# Patient Record
Sex: MALE | Race: OTHER | Employment: FULL TIME | URBAN - METROPOLITAN AREA
[De-identification: names, ages, dates, MRNs, and addresses within clinical notes are randomized per-mention and may not be internally consistent; named-entity substitution may affect disease eponyms.]

---

## 2017-09-29 ENCOUNTER — HOSPITAL ENCOUNTER (EMERGENCY)
Age: 32
Discharge: HOME | End: 2017-09-29
Attending: EMERGENCY MEDICINE

## 2017-09-29 VITALS
WEIGHT: 255 LBS | DIASTOLIC BLOOD PRESSURE: 78 MMHG | HEART RATE: 91 BPM | TEMPERATURE: 98.4 F | BODY MASS INDEX: 36.51 KG/M2 | OXYGEN SATURATION: 99 % | RESPIRATION RATE: 16 BRPM | HEIGHT: 70 IN | SYSTOLIC BLOOD PRESSURE: 128 MMHG

## 2017-09-29 DIAGNOSIS — H66.92 LEFT OTITIS MEDIA, UNSPECIFIED CHRONICITY, UNSPECIFIED OTITIS MEDIA TYPE: ICD-10-CM

## 2017-09-29 DIAGNOSIS — J06.9 UPPER RESPIRATORY TRACT INFECTION, UNSPECIFIED TYPE: Primary | ICD-10-CM

## 2017-09-29 PROCEDURE — 99283 EMERGENCY DEPT VISIT LOW MDM: CPT

## 2017-09-29 RX ORDER — GUAIFENESIN AND DEXTROMETHORPHAN HYDROBROMIDE 100; 10 MG/5ML; MG/5ML
5 SOLUTION ORAL EVERY 6 HOURS PRN
Qty: 120 ML | Refills: 0 | Status: SHIPPED | OUTPATIENT
Start: 2017-09-29

## 2017-09-29 RX ORDER — CETIRIZINE HYDROCHLORIDE 10 MG/1
10 TABLET ORAL DAILY
Qty: 15 TABLET | Refills: 0 | Status: SHIPPED | OUTPATIENT
Start: 2017-09-29

## 2017-09-29 RX ORDER — AMOXICILLIN 875 MG/1
875 TABLET, COATED ORAL 2 TIMES DAILY
Qty: 20 TABLET | Refills: 0 | Status: SHIPPED | OUTPATIENT
Start: 2017-09-29 | End: 2017-10-09

## 2017-09-29 ASSESSMENT — ENCOUNTER SYMPTOMS
SORE THROAT: 1
ABDOMINAL PAIN: 0
NAUSEA: 0
VOMITING: 0
WHEEZING: 0
RHINORRHEA: 1
TROUBLE SWALLOWING: 0
SHORTNESS OF BREATH: 0
COUGH: 1

## 2017-09-29 ASSESSMENT — PAIN DESCRIPTION - LOCATION: LOCATION: HEAD

## 2017-09-29 ASSESSMENT — PAIN DESCRIPTION - PAIN TYPE: TYPE: ACUTE PAIN

## 2017-09-29 ASSESSMENT — PAIN SCALES - GENERAL: PAINLEVEL_OUTOF10: 7

## 2017-09-29 ASSESSMENT — PAIN DESCRIPTION - ONSET: ONSET: ON-GOING

## 2017-09-29 ASSESSMENT — PAIN DESCRIPTION - FREQUENCY: FREQUENCY: CONTINUOUS

## 2017-09-29 ASSESSMENT — PAIN DESCRIPTION - DESCRIPTORS: DESCRIPTORS: PRESSURE

## 2017-09-29 NOTE — ED AVS SNAPSHOT
After Visit Summary  (Discharge Instructions)    Medication List for Home    Based on the information you provided to us as well as any changes during this visit, the following is your updated medication list.  Compare this with your prescription bottles at home. If you have any questions or concerns, contact your primary care physician's office. Daily Medication List (This medication list can be shared with any Healthcare provider who is helping you manage your medications)      There are NEW medications for you. START taking them after you leave the hospital     amoxicillin 875 MG tablet   Commonly known as:  AMOXIL   Take 1 tablet by mouth 2 times daily for 10 days       cetirizine 10 MG tablet   Commonly known as:  ZYRTEC ALLERGY   Take 1 tablet by mouth daily       Dextromethorphan-guaiFENesin  MG/5ML Syrp   Commonly known as:  ROBITUSSIN DM   Take 5 mLs by mouth every 6 hours as needed for Cough            Where to Get Your Medications      You can get these medications from any pharmacy     Bring a paper prescription for each of these medications     amoxicillin 875 MG tablet    cetirizine 10 MG tablet    Dextromethorphan-guaiFENesin  MG/5ML Syrp               Allergies as of 9/29/2017     No Known Allergies      Immunizations as of 9/29/2017     No immunizations on file. After Visit Summary    This summary was created for you. Thank you for entrusting your care to us.   The following information includes details about your hospital/visit stay along with steps you should take to help with your recovery once you leave the hospital.  In this packet, you will find information about the topics listed below:    · Instructions about your medications including a list of your home medications  · A summary of your hospital visit  · Follow-up appointments once you have left the hospital  · Your care plan at home You may receive a survey regarding the care you received during your stay. Your input is valuable to us. We encourage you to complete and return your survey in the envelope provided. We hope you will choose us in the future for your healthcare needs. Patient Information     Patient Name DAVID Hamilton 1985      Care Provided at:     Name Address Phone       Rickey [address] [facility] [place]  [address] [phone]            Your Visit    Here you will find information about your visit, including the reason for your visit. Please take this sheet with you when you visit your doctor or other health care provider in the future. It will help determine the best possible medical care for you at that time. If you have any questions once you leave the hospital, please call the department phone number listed below. Diagnoses this visit     Your diagnoses were UPPER RESPIRATORY TRACT INFECTION, UNSPECIFIED TYPE and LEFT OTITIS MEDIA, UNSPECIFIED CHRONICITY, UNSPECIFIED OTITIS MEDIA TYPE. Visit Information     Date of Visit Department Dept Phone    2017 [facility] [phone]      You were seen by     You were seen by Tennille Gan MD and Haleigh Simmons CNP. Follow-up Appointments    Below is a list of your follow-up and future appointments. This may not be a complete list as you may have made appointments directly with providers that we are not aware of or your providers may have made some for you. Please call your providers to confirm appointments. It is important to keep your appointments. Please bring your current insurance card, photo ID, co-pay, and all medication bottles to your appointment. If self-pay, payment is expected at the time of service. Follow-up Information     Please follow up.     Contact information:    PCP      Preventive Care        Date Due    Yearly Flu Vaccine (1) 2017 Care Plan Once You Return Home    This section includes instructions you will need to follow once you leave the hospital.  Your care team will discuss these with you, so you and those caring for you know how to best care for your health needs at home. This section may also include educational information about certain health topics that may be of help to you. Important Information if you smoke or are exposed to smoking       SMOKING: QUIT SMOKING. THIS IS THE MOST IMPORTANT ACTION YOU CAN TAKE TO IMPROVE YOUR CURRENT AND FUTURE HEALTH. Call the [facility] NOW ([phone])    Smoking harms nonsmokers. When nonsmokers are around people who smoke, they absorb nicotine, carbon monoxide, and other ingredients of tobacco smoke. DO NOT SMOKE AROUND CHILDREN     Children exposed to secondhand smoke are at an increased risk of:  Sudden Infant Death Syndrome (SIDS), acute respiratory infections, inflammation of the middle ear, and severe asthma. Over a longer time, it causes heart disease and lung cancer. There is no safe level of exposure to secondhand smoke. Important information for a smoker       SMOKING: QUIT SMOKING. THIS IS THE MOST IMPORTANT ACTION YOU CAN TAKE TO IMPROVE YOUR CURRENT AND FUTURE HEALTH. Call the [facility] NOW ([phone])    Smoking harms nonsmokers. When nonsmokers are around people who smoke, they absorb nicotine, carbon monoxide, and other ingredients of tobacco smoke. DO NOT SMOKE AROUND CHILDREN     Children exposed to secondhand smoke are at an increased risk of:  Sudden Infant Death Syndrome (SIDS), acute respiratory infections, inflammation of the middle ear, and severe asthma. Over a longer time, it causes heart disease and lung cancer. There is no safe level of exposure to secondhand smoke.                 [employer] Local.com allows you to send messages to your doctor, view your test results, renew your prescriptions, schedule appointments, view visit notes, and more. How Do I Sign Up? 1. In your Internet browser, go to [email or web address]  2. Click on the Sign Up Now link in the Sign In box. You will see the New Member Sign Up page. 3. Enter your Local.com Access Code exactly as it appears below. You will not need to use this code after youve completed the sign-up process. If you do not sign up before the expiration date, you must request a new code. Local.com Access Code: [email or web address]  Expires: 11/28/2017  1:22 PM    4. Enter your Social Security Number ([id number]) and Date of Birth (mm/dd/yyyy) as indicated and click Submit. You will be taken to the next sign-up page. 5. Create a Local.com ID. This will be your Local.com login ID and cannot be changed, so think of one that is secure and easy to remember. 6. Create a Local.com password. You can change your password at any time. 7. Enter your Password Reset Question and Answer. This can be used at a later time if you forget your password. 8. Enter your e-mail address. You will receive e-mail notification when new information is available in [address] 9. Click Sign Up. You can now view your medical record. Additional Information  If you have questions, please contact the physician practice where you receive care. Remember, Local.com is NOT to be used for urgent needs. For medical emergencies, dial 911. For questions regarding your Local.com account call [phone]. If you have a clinical question, please call your doctor's office. View your information online  ? Review your current list of  medications, immunization, and allergies. ? Review your future test results online . ?  Review your discharge instructions provided by your caregivers at discharge    Certain functionality such as prescription refills, scheduling problems. It's also a good idea to know your test results and keep a list of the medicines you take. How can you care for yourself at home? · To prevent dehydration, drink plenty of fluids, enough so that your urine is light yellow or clear like water. Choose water and other caffeine-free clear liquids until you feel better. If you have kidney, heart, or liver disease and have to limit fluids, talk with your doctor before you increase the amount of fluids you drink. · Take an over-the-counter pain medicine, such as acetaminophen (Tylenol), ibuprofen (Advil, Motrin), or naproxen (Aleve). Read and follow all instructions on the label. · Before you use cough and cold medicines, check the label. These medicines may not be safe for young children or for people with certain health problems. · Be careful when taking over-the-counter cold or flu medicines and Tylenol at the same time. Many of these medicines have acetaminophen, which is Tylenol. Read the labels to make sure that you are not taking more than the recommended dose. Too much acetaminophen (Tylenol) can be harmful. · Get plenty of rest.  · Do not smoke or allow others to smoke around you. If you need help quitting, talk to your doctor about stop-smoking programs and medicines. These can increase your chances of quitting for good. When should you call for help? Call 911 anytime you think you may need emergency care. For example, call if:  · You have severe trouble breathing. Call your doctor now or seek immediate medical care if:  · You seem to be getting much sicker. · You have new or worse trouble breathing. · You have a new or higher fever. · You have a new rash. Watch closely for changes in your health, and be sure to contact your doctor if:  · You have a new symptom, such as a sore throat, an earache, or sinus pain. · You cough more deeply or more often, especially if you notice more mucus or a change in the color of your mucus. · You do not get better as expected. Where can you learn more? Go to https://chpepiceweb.[email or web address] org and sign in to your [employer] account. Enter G310 in the Popular Pays box to learn more about \"Upper Respiratory Infection (Cold): Care Instructions. \"     If you do not have an account, please click on the \"Sign Up Now\" link. Current as of: March 25, 2017  Content Version: 11.3  © 9056-1658 Astrid. Care instructions adapted under license by [employer]. If you have questions about a medical condition or this instruction, always ask your healthcare professional. [address] any warranty or liability for your use of this information. Ear Infection (Otitis Media): Care Instructions  Your Care Instructions    An ear infection may start with a cold and affect the middle ear (otitis media). It can hurt a lot. Most ear infections clear up on their own in a couple of days. Most often you will not need antibiotics. This is because many ear infections are caused by a virus. Antibiotics don't work against a virus. Regular doses of pain medicines are the best way to reduce your fever and help you feel better. Follow-up care is a key part of your treatment and safety. Be sure to make and go to all appointments, and call your doctor if you are having problems. It's also a good idea to know your test results and keep a list of the medicines you take. How can you care for yourself at home? · Take pain medicines exactly as directed. ¨ If the doctor gave you a prescription medicine for pain, take it as prescribed. ¨ If you are not taking a prescription pain medicine, take an over-the-counter medicine, such as acetaminophen (Tylenol), ibuprofen (Advil, Motrin), or naproxen (Aleve). Read and follow all instructions on the label.   ¨ Do not take two or more pain medicines at the same time unless the doctor told you to. Many pain medicines have acetaminophen, which is Tylenol. Too much acetaminophen (Tylenol) can be harmful. · Plan to take a full dose of pain reliever before bedtime. Getting enough sleep will help you get better. · Try a warm, moist washcloth on the ear. It may help relieve pain. · If your doctor prescribed antibiotics, take them as directed. Do not stop taking them just because you feel better. You need to take the full course of antibiotics. When should you call for help? Call your doctor now or seek immediate medical care if:  · You have new or increasing ear pain. · You have new or increasing pus or blood draining from your ear. · You have a fever with a stiff neck or a severe headache. Watch closely for changes in your health, and be sure to contact your doctor if:  · You have new or worse symptoms. · You are not getting better after taking an antibiotic for 2 days. Where can you learn more? Go to https://Halldis.AriadNEXT. org and sign in to your Battle[employer] account. Enter U247 in the [employer] Entertainment box to learn more about \"Ear Infection (Otitis Media): Care Instructions. \"     If you do not have an account, please click on the \"Sign Up Now\" link. Current as of: May 4, 2017  Content Version: 11.3  © 2942-3824 [employer]orporated. Care instructions adapted under license by [facility] ([facility]). If you have questions about a medical condition or this instruction, always ask your healthcare professional. [place] [address] any warranty or liability for your use of this information.

## 2017-09-29 NOTE — ED PROVIDER NOTES
[address] ED  eMERGENCY dEPARTMENT eNCOUnter      Pt Name: Gail Casiano  MRN: [id number]  [place] 1985  Date of evaluation: 9/29/2017  Provider: Amadou Araya       Chief Complaint   Patient presents with    Cough    Nasal Congestion    Pharyngitis         HISTORY OF PRESENT ILLNESS  (Location/Symptom, Timing/Onset, Context/Setting, Quality, Duration, Modifying Factors, Severity.)   Gail Casiano is a 28 y.o. male who presents to the emergency department with complaints of cough, congestion and sore throat. Relates that symptoms started 3 days ago with sore throat. Now he is having rhinorrhea, congestion and dry cough. No fever or chills. No nausea or vomiting. No chest pain, shortness of breath or wheezing. Smokes pack of cigarettes a day. Nursing Notes were reviewed and I agree. REVIEW OF SYSTEMS    (2-9 systems for level 4, 10 or more for level 5)     Review of Systems   Constitutional: Negative for chills and fever. HENT: Positive for congestion, rhinorrhea and sore throat. Negative for trouble swallowing. Respiratory: Positive for cough. Negative for shortness of breath and wheezing. Cardiovascular: Negative for chest pain and palpitations. Gastrointestinal: Negative for abdominal pain, nausea and vomiting. Neurological: Negative for dizziness and headaches. Except as noted above the remainder of the review of systems was reviewed and negative. PAST MEDICAL HISTORY   No past medical history on file. Reviewed. SURGICAL HISTORY     No past surgical history on file. Reviewed. CURRENT MEDICATIONS       Previous Medications    No medications on file   No home medications. ALLERGIES     Review of patient's allergies indicates no known allergies. FAMILY HISTORY     No family history on file. No family status information on file. Reviewed and not relevant. SOCIAL HISTORY        Reviewed.    PHYSICAL EXAM    (up to 7 for level 4, 8 or more for level 5)   ED Triage Vitals   BP Temp Temp src Pulse Resp SpO2 Height Weight   -- -- -- -- -- -- -- --                 Physical Exam   Constitutional: He is oriented to person, place, and time. He appears well-developed and well-nourished. No distress. HENT:   Head: Normocephalic and atraumatic. Right Ear: Tympanic membrane, external ear and ear canal normal.   Left Ear: External ear normal. Tympanic membrane is erythematous. Tympanic membrane is not retracted and not bulging. Nose: Rhinorrhea present. Mouth/Throat: Uvula is midline and mucous membranes are normal. No trismus in the jaw. No uvula swelling. Posterior oropharyngeal erythema present. No oropharyngeal exudate. Eyes: Right eye exhibits no discharge. Left eye exhibits no discharge. No scleral icterus. Neck: Normal range of motion. No tracheal deviation present. Cardiovascular: Normal rate, regular rhythm and normal heart sounds. Pulmonary/Chest: Effort normal and breath sounds normal. No stridor. No respiratory distress. He has no wheezes. He has no rales. Musculoskeletal: Normal range of motion. He exhibits no edema. Neurological: He is alert and oriented to person, place, and time. Coordination normal.   Skin: Skin is warm and dry. He is not diaphoretic. Psychiatric: He has a normal mood and affect. His behavior is normal.       DIAGNOSTIC RESULTS     RADIOLOGY:   none    LABS:  Labs Reviewed - No data to display    All other labs were within normal range or not returned as of this dictation. EMERGENCY DEPARTMENT COURSE and DIFFERENTIAL DIAGNOSIS/MDM:   Patient presents to ED with complaints of Cough, congestion and sore throat. We'll treat for URI and left otitis media with amoxicillin. Cough medication, antihistamine. Increase fluids. Work note given. Okay to discharge home. Follow-up with family doctor or clinic of choice in 1 or 2 days for a recheck.   Return to ED if any worsening or new

## 2017-09-30 NOTE — ED PROVIDER NOTES
[address] ED  eMERGENCY dEPARTMENT eNCOUnter   Independent Attestation     Pt Name: Henok Adams  MRN: [id number]  [place] 1985  Date of evaluation: 9/29/17     Henok Adams is a 28 y.o. male with CC: Cough; Nasal Congestion; and Pharyngitis      Based on the medical record the care appears appropriate. I was personally available for consultation in the Emergency Department.     Christy Gonzalez MD  Attending Emergency Physician                 Christy Gonzalez MD  09/29/17 [id number]

## 2017-11-05 ENCOUNTER — HOSPITAL ENCOUNTER (EMERGENCY)
Age: 32
Discharge: HOME | End: 2017-11-05
Attending: EMERGENCY MEDICINE

## 2017-11-05 VITALS
HEART RATE: 73 BPM | HEIGHT: 71 IN | SYSTOLIC BLOOD PRESSURE: 148 MMHG | OXYGEN SATURATION: 96 % | RESPIRATION RATE: 16 BRPM | WEIGHT: 250 LBS | DIASTOLIC BLOOD PRESSURE: 93 MMHG | BODY MASS INDEX: 35 KG/M2 | TEMPERATURE: 98.5 F

## 2017-11-05 DIAGNOSIS — K02.9 DENTAL CARIES: ICD-10-CM

## 2017-11-05 DIAGNOSIS — K08.89 PAIN, DENTAL: Primary | ICD-10-CM

## 2017-11-05 PROCEDURE — 6370000000 HC RX 637 (ALT 250 FOR IP): Performed by: EMERGENCY MEDICINE

## 2017-11-05 PROCEDURE — 64402 HC NERVE BLOCK OF FACIAL NERVE: CPT

## 2017-11-05 PROCEDURE — 2500000003 HC RX 250 WO HCPCS: Performed by: EMERGENCY MEDICINE

## 2017-11-05 PROCEDURE — 99282 EMERGENCY DEPT VISIT SF MDM: CPT

## 2017-11-05 RX ORDER — BUPIVACAINE HYDROCHLORIDE 5 MG/ML
30 INJECTION, SOLUTION EPIDURAL; INTRACAUDAL ONCE
Status: DISCONTINUED | OUTPATIENT
Start: 2017-11-05 | End: 2017-11-05

## 2017-11-05 RX ORDER — PENICILLIN V POTASSIUM 500 MG/1
500 TABLET ORAL 4 TIMES DAILY
Qty: 28 TABLET | Refills: 0 | Status: SHIPPED | OUTPATIENT
Start: 2017-11-05 | End: 2017-11-12

## 2017-11-05 RX ORDER — PENICILLIN V POTASSIUM 250 MG/1
500 TABLET ORAL ONCE
Status: COMPLETED | OUTPATIENT
Start: 2017-11-05 | End: 2017-11-05

## 2017-11-05 RX ORDER — BUPIVACAINE HYDROCHLORIDE 2.5 MG/ML
30 INJECTION, SOLUTION EPIDURAL; INFILTRATION; INTRACAUDAL ONCE
Status: COMPLETED | OUTPATIENT
Start: 2017-11-05 | End: 2017-11-05

## 2017-11-05 RX ADMIN — PENICILLIN V POTASIUM 500 MG: 250 TABLET ORAL at 23:24

## 2017-11-05 RX ADMIN — BUPIVACAINE HYDROCHLORIDE 75 MG: 2.5 INJECTION, SOLUTION EPIDURAL; INFILTRATION; INTRACAUDAL; PERINEURAL at 23:24

## 2017-11-05 ASSESSMENT — PAIN SCALES - GENERAL
PAINLEVEL_OUTOF10: 7
PAINLEVEL_OUTOF10: 7

## 2017-11-05 ASSESSMENT — ENCOUNTER SYMPTOMS
NAUSEA: 0
COUGH: 0
VOMITING: 0
SHORTNESS OF BREATH: 0
ABDOMINAL PAIN: 0
RHINORRHEA: 0
SORE THROAT: 0

## 2017-11-05 ASSESSMENT — PAIN DESCRIPTION - LOCATION: LOCATION: TEETH

## 2017-11-05 ASSESSMENT — PAIN DESCRIPTION - ORIENTATION: ORIENTATION: LEFT

## 2017-11-05 ASSESSMENT — PAIN DESCRIPTION - PAIN TYPE: TYPE: ACUTE PAIN

## 2017-11-06 NOTE — ED NOTES
Pt discharged on stable condition. Educated on RX's and discharge instructions reviewed. Pt educated to follow up as recommended with dentist. List of providers given at discharge. Pt ambulating well -stable gait.       Jose Noyola RN  11/05/17 9571

## 2017-11-06 NOTE — ED PROVIDER NOTES
[facility]     Emergency Department     Faculty Attestation    I performed a history and physical examination of the patient and discussed management with the resident. I reviewed the residents note and agree with the documented findings and plan of care. Any areas of disagreement are noted on the chart. I was personally present for the key portions of any procedures. I have documented in the chart those procedures where I was not present during the key portions. I have reviewed the emergency nurses triage note. I agree with the chief complaint, past medical history, past surgical history, allergies, medications, social and family history as documented unless otherwise noted below. Documentation of the HPI, Physical Exam and Medical Decision Making performed by medical students or scribes is based on my personal performance of the HPI, PE and MDM. For APC cases, I have personally evaluated and examined the patient in conjunction with the APC and agree with the assessment, treatment plan, and disposition of the patient as recorded by the APCAdditional findings are as noted.       CHIEF COMPLAINT       Chief Complaint   Patient presents with    Dental Pain       RECENT VITALS:   BP (!) 148/93   Pulse 73   Temp 98.5 °F (36.9 °C) (Oral)   Resp 16   Ht 5' 10.5\" (1.791 m)   Wt 250 lb (113.4 kg)   SpO2 96%   BMI 35.36 kg/m²       PERTINENT ATTENDING PHYSICIAN COMMENTS:            Kristyn Louis MD, ANAST[place]IA, [address] [facility]  Attending Emergency Physician           Kristyn Louis MD  11/05/17 1362

## 2017-11-06 NOTE — ED PROVIDER NOTES
[address]  Emergency Department Encounter  Emergency Medicine Resident     Pt Name: Henok Adams  MRN: [id number]  [place] 1985  Date of evaluation: 11/5/17  PCP:  No primary care provider on file. CHIEF COMPLAINT       Chief Complaint   Patient presents with    Dental Pain       HISTORY OF PRESENT ILLNESS  (Location/Symptom, Timing/Onset, Context/Setting, Quality, Duration, Modifying Factors, Severity.)      Henok Adams is a 28 y.o. male who presents with Dental pain. Patient states the pain has been ongoing for the past week and has been progressively worse. Patient localizes pain to the lower left molars. Patient has been able to eat and drink. Patient was taking ibuprofen and Orajel but states that today the pain got worse and has no significant relief with ibuprofen and Orajel. Patient denies any shortness breath, chest pain, fevers, nausea, or vomiting. Patient denies any difficulty swallowing. PAST MEDICAL / SURGICAL / SOCIAL / FAMILY HISTORY      has no past medical history on file. has a past surgical history that includes cyst removal (2001). Social History     Social History    Marital status: Single     Spouse name: N/A    Number of children: N/A    Years of education: N/A     Occupational History    Not on file. Social History Main Topics    Smoking status: Current Every Day Smoker     Packs/day: 0.50     Types: Cigarettes    Smokeless tobacco: Never Used    Alcohol use Yes      Comment: socially    Drug use: No    Sexual activity: Not on file     Other Topics Concern    Not on file     Social History Narrative    No narrative on file       No family history on file. Allergies:  Review of patient's allergies indicates no known allergies. Home Medications:  Prior to Admission medications    Medication Sig Start Date End Date Taking?  Authorizing Provider   penicillin v potassium (VEETID) 500 MG tablet Take 1 tablet by mouth 4 times daily for 7 injected for inferior alveolar block  Patient tolerated procedure well with no immediate complications. CONSULTS:  None    CRITICAL CARE:  None    FINAL IMPRESSION      1. Pain, dental    2.  Dental caries          DISPOSITION / PLAN     DISPOSITION Decision to Discharge    PATIENT REFERRED TO:  [facility] ED  [address] [facility] [address]  [phone]  Go today  As needed, If symptoms worsen    dental clinic from list    Call in 1 day  To follow up as soon as possible      DISCHARGE MEDICATIONS:  New Prescriptions    PENICILLIN V POTASSIUM (VEETID) 500 MG TABLET    Take 1 tablet by mouth 4 times daily for 7 days       Ignacia Stone DO  Emergency Medicine Resident    (Please note that portions of this note were completed with a voice recognition program.  Efforts were made to edit the dictations but occasionally words are mis-transcribed.)      Ignacia Stone DO  11/05/17 [id number]

## 2017-11-14 ENCOUNTER — HOSPITAL ENCOUNTER (EMERGENCY)
Age: 32
Discharge: HOME | End: 2017-11-14
Attending: EMERGENCY MEDICINE

## 2017-11-14 VITALS
WEIGHT: 260 LBS | DIASTOLIC BLOOD PRESSURE: 88 MMHG | OXYGEN SATURATION: 94 % | TEMPERATURE: 98.1 F | BODY MASS INDEX: 36.4 KG/M2 | HEIGHT: 71 IN | RESPIRATION RATE: 18 BRPM | HEART RATE: 107 BPM | SYSTOLIC BLOOD PRESSURE: 120 MMHG

## 2017-11-14 DIAGNOSIS — J01.90 ACUTE SINUSITIS, RECURRENCE NOT SPECIFIED, UNSPECIFIED LOCATION: Primary | ICD-10-CM

## 2017-11-14 DIAGNOSIS — J40 BRONCHITIS: ICD-10-CM

## 2017-11-14 PROCEDURE — 99283 EMERGENCY DEPT VISIT LOW MDM: CPT

## 2017-11-14 RX ORDER — PSEUDOEPHEDRINE HCL 120 MG/1
120 TABLET, FILM COATED, EXTENDED RELEASE ORAL EVERY 12 HOURS
Qty: 14 TABLET | Refills: 0 | Status: SHIPPED | OUTPATIENT
Start: 2017-11-14 | End: 2017-11-21

## 2017-11-14 RX ORDER — FLUTICASONE PROPIONATE 50 MCG
1 SPRAY, SUSPENSION (ML) NASAL 2 TIMES DAILY
Qty: 1 BOTTLE | Refills: 0 | Status: SHIPPED | OUTPATIENT
Start: 2017-11-14

## 2017-11-14 RX ORDER — IBUPROFEN 600 MG/1
600 TABLET ORAL EVERY 6 HOURS PRN
Qty: 30 TABLET | Refills: 0 | Status: SHIPPED | OUTPATIENT
Start: 2017-11-14

## 2017-11-14 ASSESSMENT — PAIN DESCRIPTION - PAIN TYPE: TYPE: ACUTE PAIN

## 2017-11-14 ASSESSMENT — PAIN DESCRIPTION - ORIENTATION: ORIENTATION: MID

## 2017-11-14 ASSESSMENT — ENCOUNTER SYMPTOMS
ABDOMINAL PAIN: 0
RHINORRHEA: 1
VOMITING: 0
BACK PAIN: 0
EYE PAIN: 0
SORE THROAT: 1
COUGH: 1
SHORTNESS OF BREATH: 0
EYE REDNESS: 0
EYE DISCHARGE: 0
DIARRHEA: 0

## 2017-11-14 ASSESSMENT — PAIN SCALES - GENERAL: PAINLEVEL_OUTOF10: 6

## 2017-11-14 ASSESSMENT — PAIN DESCRIPTION - LOCATION: LOCATION: CHEST;BACK

## 2017-11-14 NOTE — ED PROVIDER NOTES
[address]  eMERGENCY dEPARTMENT eNCOUnter      Pt Name: Carla Johnson  MRN: [id number]  [place] 1985  Date of evaluation: 11/14/2017  PCP:    No primary care provider on file. CHIEF COMPLAINT       Chief Complaint   Patient presents with    Cough    Chest Pain    Fatigue         HISTORY OF PRESENT ILLNESS   HPI   Carla Johnson is a 28 y.o. male who presents For evaluation for cough and congestion. Patient states he's had this for about 3 days now. His Raynaud's a congestion. Has a mild sore throat. Also states the cough without productive sputum. He just feels generally fatigued. He was sent in by his work. No fevers however he is chilled at times. Otherwise no specific complaints       REVIEW OF SYSTEMS         Review of Systems   Constitutional: Positive for chills and fatigue. Negative for fever. HENT: Positive for congestion, rhinorrhea and sore throat. Eyes: Negative for pain, discharge and redness. Respiratory: Positive for cough. Negative for shortness of breath. Cardiovascular: Negative for chest pain. Gastrointestinal: Negative for abdominal pain, diarrhea and vomiting. Genitourinary: Negative for dysuria and hematuria. Musculoskeletal: Negative for arthralgias, back pain, myalgias, neck pain and neck stiffness. Skin: Negative for rash. Neurological: Negative for light-headedness and headaches. Hematological: Does not bruise/bleed easily. PAST MEDICAL HISTORY   History reviewed. No pertinent past medical history.     SURGICAL HISTORY       Past Surgical History:   Procedure Laterality Date    CYST REMOVAL  2001       CURRENT MEDICATIONS       Previous Medications    CETIRIZINE (ZYRTEC ALLERGY) 10 MG TABLET    Take 1 tablet by mouth daily    DEXTROMETHORPHAN-GUAIFENESIN (ROBITUSSIN DM)  MG/5ML SYRP    Take 5 mLs by mouth every 6 hours as needed for Cough       ALLERGIES     Review of patient's allergies indicates no known allergies. FAMILY HISTORY       No family status information on file. History reviewed. No pertinent family history. SOCIAL HISTORY       Social History     Social History    Marital status: Single     Spouse name: N/A    Number of children: N/A    Years of education: N/A     Social History Main Topics    Smoking status: Current Every Day Smoker     Packs/day: 0.75     Types: Cigarettes    Smokeless tobacco: Never Used    Alcohol use Yes      Comment: socially    Drug use: No    Sexual activity: Not Asked     Other Topics Concern    None     Social History Narrative    None       PHYSICAL EXAM     INITIAL VITALS:  height is 5' 11\" (1.803 m) and weight is 260 lb (117.9 kg). His oral temperature is 98.1 °F (36.7 °C). His blood pressure is 120/88 and his pulse is 107. His respiration is 18 and oxygen saturation is 94%. Physical Exam   Constitutional: He is oriented to person, place, and time. He appears well-developed and well-nourished. HENT:   Head: Normocephalic and atraumatic. Mouth/Throat: Oropharynx is clear and moist.   Mild pharyngeal erythema without asymmetry and no exudates   Eyes: Conjunctivae and EOM are normal. Pupils are equal, round, and reactive to light. Right eye exhibits no discharge. Left eye exhibits no discharge. Neck: Normal range of motion. Neck supple. Cardiovascular: Regular rhythm, normal heart sounds and intact distal pulses. 92 per minute exam   Pulmonary/Chest: Effort normal and breath sounds normal. No respiratory distress. He has no wheezes. Lung exams unremarkable   Abdominal: Soft. Bowel sounds are normal. He exhibits no distension. There is no tenderness. Musculoskeletal: Normal range of motion. Neurological: He is alert and oriented to person, place, and time. Skin: Skin is warm and dry. Nursing note and vitals reviewed.       DIFFERENTIAL DIAGNOSIS/ MDM:     Patient here with likely a viral process with sinus congestion and sinusitis and cough and bronchitis. Vitals are stable. Lung exam is clear. At this time likely viral process and instructions given with symptom medications recommended    DIAGNOSTIC RESULTS         none    EMERGENCY DEPARTMENT COURSE:   Vitals:    Vitals:    11/14/17 1326   BP: 120/88   Pulse: 107   Resp: 18   Temp: 98.1 °F (36.7 °C)   TempSrc: Oral   SpO2: 94%   Weight: 260 lb (117.9 kg)   Height: 5' 11\" (1.803 m)     -------------------------  BP: 120/88, Temp: 98.1 °F (36.7 °C), Pulse: 107, Resp: 18      FINAL IMPRESSION      1. Acute sinusitis, recurrence not specified, unspecified location    2.  Bronchitis          DISPOSITION/PLAN    DISPOSITION Decision to Discharge    PATIENT REFERRED TO:  your PCP    Call in 1 day        DISCHARGE MEDICATIONS:  New Prescriptions    FLUTICASONE (FLONASE) 50 MCG/ACT NASAL SPRAY    1 spray by Nasal route 2 times daily    IBUPROFEN (ADVIL;MOTRIN) 600 MG TABLET    Take 1 tablet by mouth every 6 hours as needed for Pain    PSEUDOEPHEDRINE (SUDAFED 12 HOUR) 120 MG EXTENDED RELEASE TABLET    Take 1 tablet by mouth every 12 hours for 7 days       (Please note that portions of this note were completed with a voice recognition program.  Efforts were made to edit the dictations but occasionally words are mis-transcribed.)    Chayo Still MD, [place], [facility]  Attending Emergency Physician                     Chayo Still MD  11/14/17 [id number]

## 2019-10-14 ENCOUNTER — HOSPITAL ENCOUNTER (EMERGENCY)
Age: 34
Discharge: HOME | End: 2019-10-14
Attending: EMERGENCY MEDICINE

## 2019-10-14 ENCOUNTER — APPOINTMENT (OUTPATIENT)
Dept: GENERAL RADIOLOGY | Age: 34
End: 2019-10-14

## 2019-10-14 VITALS
HEIGHT: 71 IN | DIASTOLIC BLOOD PRESSURE: 84 MMHG | SYSTOLIC BLOOD PRESSURE: 129 MMHG | RESPIRATION RATE: 16 BRPM | WEIGHT: 300 LBS | HEART RATE: 79 BPM | BODY MASS INDEX: 42 KG/M2 | OXYGEN SATURATION: 99 % | TEMPERATURE: 98.5 F

## 2019-10-14 DIAGNOSIS — M25.512 ACUTE PAIN OF LEFT SHOULDER: Primary | ICD-10-CM

## 2019-10-14 DIAGNOSIS — M77.8 TENDINITIS OF LEFT SHOULDER: ICD-10-CM

## 2019-10-14 PROCEDURE — 99283 EMERGENCY DEPT VISIT LOW MDM: CPT

## 2019-10-14 PROCEDURE — 73030 X-RAY EXAM OF SHOULDER: CPT

## 2019-10-14 RX ORDER — CYCLOBENZAPRINE HCL 10 MG
10 TABLET ORAL 3 TIMES DAILY PRN
Qty: 15 TABLET | Refills: 0 | Status: SHIPPED | OUTPATIENT
Start: 2019-10-14

## 2019-10-14 RX ORDER — NAPROXEN 500 MG/1
500 TABLET ORAL 2 TIMES DAILY
Qty: 20 TABLET | Refills: 0 | Status: SHIPPED | OUTPATIENT
Start: 2019-10-14

## 2019-10-14 ASSESSMENT — PAIN DESCRIPTION - PAIN TYPE: TYPE: ACUTE PAIN

## 2019-10-14 ASSESSMENT — ENCOUNTER SYMPTOMS
COUGH: 0
NAUSEA: 0
SHORTNESS OF BREATH: 0
TROUBLE SWALLOWING: 0
VOMITING: 0

## 2019-10-14 ASSESSMENT — PAIN DESCRIPTION - LOCATION: LOCATION: SHOULDER

## 2019-10-14 ASSESSMENT — PAIN SCALES - GENERAL: PAINLEVEL_OUTOF10: 7

## 2019-10-14 ASSESSMENT — PAIN DESCRIPTION - ORIENTATION: ORIENTATION: LEFT

## 2021-06-05 ENCOUNTER — HOSPITAL ENCOUNTER (EMERGENCY)
Age: 36
Discharge: HOME | End: 2021-06-05
Attending: EMERGENCY MEDICINE

## 2021-06-05 VITALS
TEMPERATURE: 97.5 F | DIASTOLIC BLOOD PRESSURE: 94 MMHG | RESPIRATION RATE: 16 BRPM | OXYGEN SATURATION: 97 % | SYSTOLIC BLOOD PRESSURE: 154 MMHG | HEIGHT: 64 IN | HEART RATE: 85 BPM | BODY MASS INDEX: 34.15 KG/M2 | WEIGHT: 200 LBS

## 2021-06-05 DIAGNOSIS — S05.02XA ABRASION OF LEFT CORNEA, INITIAL ENCOUNTER: Primary | ICD-10-CM

## 2021-06-05 PROCEDURE — 99283 EMERGENCY DEPT VISIT LOW MDM: CPT

## 2021-06-05 PROCEDURE — 2500000003 HC RX 250 WO HCPCS: Performed by: STUDENT IN AN ORGANIZED HEALTH CARE EDUCATION/TRAINING PROGRAM

## 2021-06-05 PROCEDURE — 6370000000 HC RX 637 (ALT 250 FOR IP): Performed by: STUDENT IN AN ORGANIZED HEALTH CARE EDUCATION/TRAINING PROGRAM

## 2021-06-05 RX ORDER — ERYTHROMYCIN 5 MG/G
OINTMENT OPHTHALMIC
Qty: 1 TUBE | Refills: 0 | Status: SHIPPED | OUTPATIENT
Start: 2021-06-05 | End: 2021-06-15

## 2021-06-05 RX ORDER — PROPARACAINE HYDROCHLORIDE 5 MG/ML
1 SOLUTION/ DROPS OPHTHALMIC ONCE
Status: COMPLETED | OUTPATIENT
Start: 2021-06-05 | End: 2021-06-05

## 2021-06-05 RX ORDER — ERYTHROMYCIN 5 MG/G
OINTMENT OPHTHALMIC ONCE
Status: COMPLETED | OUTPATIENT
Start: 2021-06-05 | End: 2021-06-05

## 2021-06-05 RX ADMIN — FLUORESCEIN SODIUM 1 MG: 1 STRIP OPHTHALMIC at 15:00

## 2021-06-05 RX ADMIN — ERYTHROMYCIN: 5 OINTMENT OPHTHALMIC at 15:23

## 2021-06-05 RX ADMIN — PROPARACAINE HYDROCHLORIDE 1 DROP: 5 SOLUTION/ DROPS OPHTHALMIC at 15:00

## 2021-06-05 ASSESSMENT — PAIN DESCRIPTION - PAIN TYPE: TYPE: ACUTE PAIN

## 2021-06-05 ASSESSMENT — ENCOUNTER SYMPTOMS
PHOTOPHOBIA: 1
EYE PAIN: 1
EYE REDNESS: 1
SHORTNESS OF BREATH: 0
RHINORRHEA: 1
COUGH: 0
NAUSEA: 0
SORE THROAT: 0
EYE DISCHARGE: 1
EYE ITCHING: 1
VOMITING: 0
DIARRHEA: 0

## 2021-06-05 ASSESSMENT — PAIN DESCRIPTION - ORIENTATION: ORIENTATION: LEFT;RIGHT

## 2021-06-05 ASSESSMENT — PAIN DESCRIPTION - DESCRIPTORS: DESCRIPTORS: ACHING;BURNING

## 2021-06-05 ASSESSMENT — PAIN SCALES - WONG BAKER: WONGBAKER_NUMERICALRESPONSE: 8

## 2021-06-05 ASSESSMENT — PAIN DESCRIPTION - FREQUENCY: FREQUENCY: CONTINUOUS

## 2021-06-05 ASSESSMENT — PAIN DESCRIPTION - LOCATION: LOCATION: EYE

## 2021-06-05 NOTE — ED PROVIDER NOTES
[facility] ED  Emergency Department Encounter  Emergency Medicine Resident     Pt Name: Ita Castelna  MRN: [id number]  [place] 1985  Date ofevaluation: 6/5/21  PCP:  No primary care provider on file. CHIEF COMPLAINT       Chief Complaint   Patient presents with    Eye Pain     with swelling and pain reported no known cause. \"I just woke up like this\". HISTORY OF PRESENT ILLNESS  (Location/Symptom, Timing/Onset, Context/Setting, Quality, Duration, Modifying Factors, Severity, Associated signs/symptoms)     Ita Castelan is a 28 y.o. male who presents with left eye discomfort. Patient was he woke up this morning with pain, itching and redness in his left eye. He reports that he has a history of seasonal allergies and has had this before. He states that is as if he has a foreign body in this does not recall that he got anything in his eye. No trauma to the head. Denies any blurred or double vision but does report photophobia, eye redness and some mild eye discharge. PAST MEDICAL / SURGICAL / SOCIAL / FAMILY HISTORY      has no past medical history on file. has a past surgical history that includes cyst removal (2001).     Social History     Socioeconomic History    Marital status: Single     Spouse name: Not on file    Number of children: Not on file    Years of education: Not on file    Highest education level: Not on file   Occupational History    Not on file   Tobacco Use    Smoking status: Current Every Day Smoker     Packs/day: 0.75     Types: Cigarettes    Smokeless tobacco: Never Used   Substance and Sexual Activity    Alcohol use: Yes     Comment: socially    Drug use: No    Sexual activity: Not on file   Other Topics Concern    Not on file   Social History Narrative    Not on file     Social Determinants of Health     Financial Resource Strain:     Difficulty of Paying Living Expenses:    Food Insecurity:     Worried About Running Out of Food in the Last Year:     Ran Out of Food in the Last Year:    Transportation Needs:     Lack of Transportation (Medical):  Lack of Transportation (Non-Medical):    Physical Activity:     Days of Exercise per Week:     Minutes of Exercise per Session:    Stress:     Feeling of Stress :    Social Connections:     Frequency of Communication with Friends and Family:     Frequency of Social Gatherings with Friends and Family:     Attends Yarsani Services:     Active Member of Clubs or Organizations:     Attends Club or Organization Meetings:     Marital Status:    Intimate Partner Violence:     Fear of Current or Ex-Partner:     Emotionally Abused:     Physically Abused:     Sexually Abused:        No family history on file. Allergies:  Seasonal    Home Medications:  Prior to Admission medications    Medication Sig Start Date End Date Taking?  Authorizing Provider   erythromycin [facility]) 5 MG/GM ophthalmic ointment Apply half an inch (1.25 cm) 4 times daily for 5 days to the left eye. 6/5/21 6/15/21 Yes Adan Wyman DO   cyclobenzaprine (FLEXERIL) 10 MG tablet Take 1 tablet by mouth 3 times daily as needed for Muscle spasms 10/14/19   JOYCE Ritter CNP   naproxen (NAPROSYN) 500 MG tablet Take 1 tablet by mouth 2 times daily 10/14/19   JOYCE Ritter CNP   fluticasone (FLONASE) 50 MCG/ACT nasal spray 1 spray by Nasal route 2 times daily 11/14/17   Azra Rodriguez MD   ibuprofen (ADVIL;MOTRIN) 600 MG tablet Take 1 tablet by mouth every 6 hours as needed for Pain 11/14/17   Azra Rodriguez MD   Dextromethorphan-guaiFENesin (ROBITUSSIN DM)  MG/5ML SYRP Take 5 mLs by mouth every 6 hours as needed for Cough 9/29/17   JOYCE Roberts CNP   cetirizine (ZYRTEC ALLERGY) 10 MG tablet Take 1 tablet by mouth daily 9/29/17   JOYCE Roberts CNP       REVIEW OF SYSTEMS    (2-9 systems for level 4, 10 or more for level 5)      Review of Systems   Constitutional: Negative for chills and fever.   HENT: Positive for rhinorrhea. Negative for sore throat. Eyes: Positive for photophobia, pain, discharge, redness and itching. Negative for visual disturbance. Respiratory: Negative for cough and shortness of breath. Gastrointestinal: Negative for diarrhea, nausea and vomiting. Musculoskeletal: Negative for arthralgias and myalgias. Allergic/Immunologic: Positive for environmental allergies. Negative for food allergies. Neurological: Negative for speech difficulty and headaches. PHYSICAL EXAM   (up to 7 for level 4, 8 or more for level 5)      INITIAL VITALS:   BP (!) 154/94   Pulse 85   Temp 97.5 °F (36.4 °C) (Skin)   Resp 16   Ht 5' 4\" (1.626 m)   Wt 200 lb (90.7 kg)   SpO2 97%   BMI 34.33 kg/m²     Physical Exam  Vitals and nursing note reviewed. Constitutional:       General: He is not in acute distress. Appearance: Normal appearance. He is obese. He is not ill-appearing, toxic-appearing or diaphoretic. HENT:      Head: Normocephalic and atraumatic. Right Ear: Tympanic membrane and ear canal normal.      Left Ear: Tympanic membrane and ear canal normal.      Mouth/Throat:      Mouth: Mucous membranes are moist.      Pharynx: Oropharynx is clear. No oropharyngeal exudate or posterior oropharyngeal erythema. Eyes:      General: No scleral icterus. Right eye: No discharge. Left eye: No discharge. Extraocular Movements: Extraocular movements intact. Pupils: Pupils are equal, round, and reactive to light. Cardiovascular:      Rate and Rhythm: Normal rate and regular rhythm. Pulmonary:      Effort: Pulmonary effort is normal. No respiratory distress. Breath sounds: Normal breath sounds. No stridor. No wheezing, rhonchi or rales. Musculoskeletal:      Cervical back: Neck supple. Skin:     General: Skin is warm and dry. Coloration: Skin is not jaundiced. Neurological:      General: No focal deficit present.       Mental Status: He is alert and oriented to person, place, and time. Psychiatric:         Mood and Affect: Mood normal.         Behavior: Behavior normal.         DIAGNOSTICS     PLAN (LABS / IMAGING / EKG):  Orders Placed This Encounter   Procedures    Nursing Communication    Visual acuity screening       MEDICATIONS ORDERED:  Orders Placed This Encounter   Medications    proparacaine (ALCAINE) 0.5 % ophthalmic solution 1 drop    fluorescein ophthalmic strip 1 mg    erythromycin (ROMYCIN) 5 MG/GM ophthalmic ointment     Sig: Apply half an inch (1.25 cm) 4 times daily for 5 days to the left eye. Dispense:  1 Tube     Refill:  0    erythromycin (ROMYCIN) ophthalmic ointment       DIAGNOSTIC RESULTS / EMERGENCYDEPARTMENT COURSE / MDM     LABS:  No results found for this visit on 06/05/21. RADIOLOGY:  No orders to display        EMERGENCY DEPARTMENT COURSE:         MDM: 80-year-old male presenting with left eye irritation and pain. On exam he is nontoxic-appearing no acute distress. His vitals are unremarkable. Heart regular rate and rhythm, lungs are clear to auscultation bilaterally. Has some photophobia in the left eye but no significant cord conjunctival injection. There is no significant discharge pupil. Pupils are equal round and reactive to light bilaterally. Extraocular movements are intact. Differential includes corneal abrasion, conjunctivitis, allergic conjunctivitis, iritis, among others. Plan is for stain eye, ocular pressures, symptomatic treatment reassess. Patient has a possible very mild abrasion at the 6 o'clock position of the left eye. It does not involve the visual axis. Eyelids were flipped and no foreign bodies were found. Patient has significant improvement with proparacaine. Photophobia resolved with proparacaine instillation. No consensual photophobia noted. Extraocular wounds are intact. Intraocular pressure measured left eye to be about 18 mmHg.   Negative Mala sign.  Impression is corneal abrasion, will prescribe erythromycin form and have him follow-up with ophthalmology. Discussed supportive care measures. Strict return precautions given. Patient instructed to follow with his primary care provider as soon as possible for follow up. Patient and/or family expressed understanding and agreement with this plan. PROCEDURES:  None    CONSULTS:  None    FINAL IMPRESSION      1.  Abrasion of left cornea, initial encounter          DISPOSITION / PLAN     DISPOSITION Decision To Discharge 06/05/2021 03:11:51 PM      PATIENT REFERRED TO:  [facility]  [address] [place] [address]  [phone]  Go to   If symptoms worsen    Ann Marie Stokes MD  [address] [facility]  [phone]    Schedule an appointment as soon as possible for a visit   Follow up of this visit      DISCHARGE MEDICATIONS:  Discharge Medication List as of 6/5/2021  3:14 PM      START taking these medications    Details   erythromycin (ROMYCIN) 5 MG/GM ophthalmic ointment Apply half an inch (1.25 cm) 4 times daily for 5 days to the left eye., Disp-1 Tube, R-0, Print             Heber Jamison DO  Emergency Medicine Resident  [facility]    (Please note that portions of this note were completed with a voice recognition program.  Efforts were made to edit thedictations but occasionally words are mis-transcribed.)       Heber Jamison DO  Resident  06/05/21 0157

## 2021-06-05 NOTE — ED PROVIDER NOTES
[address] [facility]     Emergency Department     Faculty Attestation    I performed a history and physical examination of the patient and discussed management with the resident. I reviewed the resident´s note and agree with the documented findings and plan of care. Any areas of disagreement are noted on the chart. I was personally present for the key portions of any procedures. I have documented in the chart those procedures where I was not present during the key portions. I have reviewed the emergency nurses triage note. I agree with the chief complaint, past medical history, past surgical history, allergies, medications, social and family history as documented unless otherwise noted below. For Physician Assistant/ Nurse Practitioner cases/documentation I have personally evaluated this patient and have completed at least one if not all key elements of the E/M (history, physical exam, and MDM). Additional findings are as noted. Right droopiness of the left eyelid, extract movements intact, no drainage seen.      Harjeet Green MD  06/05/21 [id number]

## 2024-07-19 ENCOUNTER — HOSPITAL ENCOUNTER (EMERGENCY)
Age: 39
Discharge: HOME | End: 2024-07-19
Attending: EMERGENCY MEDICINE

## 2024-07-19 VITALS
OXYGEN SATURATION: 98 % | SYSTOLIC BLOOD PRESSURE: 135 MMHG | TEMPERATURE: 98 F | HEART RATE: 78 BPM | DIASTOLIC BLOOD PRESSURE: 95 MMHG | RESPIRATION RATE: 17 BRPM

## 2024-07-19 DIAGNOSIS — S05.02XA ABRASION OF LEFT CORNEA, INITIAL ENCOUNTER: Primary | ICD-10-CM

## 2024-07-19 PROCEDURE — 99283 EMERGENCY DEPT VISIT LOW MDM: CPT

## 2024-07-19 PROCEDURE — 6370000000 HC RX 637 (ALT 250 FOR IP)

## 2024-07-19 RX ORDER — TETRACAINE HYDROCHLORIDE 5 MG/ML
1 SOLUTION OPHTHALMIC ONCE
Status: COMPLETED | OUTPATIENT
Start: 2024-07-19 | End: 2024-07-19

## 2024-07-19 RX ORDER — ERYTHROMYCIN 5 MG/G
OINTMENT OPHTHALMIC
Qty: 1 G | Refills: 0 | Status: SHIPPED | OUTPATIENT
Start: 2024-07-19 | End: 2024-07-29

## 2024-07-19 RX ADMIN — TETRACAINE HYDROCHLORIDE 1 DROP: 5 SOLUTION OPHTHALMIC at 12:37

## 2024-07-19 RX ADMIN — FLUORESCEIN SODIUM 1 MG: 1 STRIP OPHTHALMIC at 12:37

## 2024-07-19 ASSESSMENT — ENCOUNTER SYMPTOMS
EYE REDNESS: 1
EYE PAIN: 1
PHOTOPHOBIA: 1

## 2024-07-19 ASSESSMENT — PAIN SCALES - GENERAL: PAINLEVEL_OUTOF10: 7

## 2024-07-19 ASSESSMENT — PAIN - FUNCTIONAL ASSESSMENT: PAIN_FUNCTIONAL_ASSESSMENT: 0-10

## 2024-07-19 ASSESSMENT — PAIN DESCRIPTION - DESCRIPTORS: DESCRIPTORS: SHARP

## 2024-07-19 ASSESSMENT — PAIN DESCRIPTION - LOCATION: LOCATION: EYE

## 2024-07-19 ASSESSMENT — PAIN DESCRIPTION - ORIENTATION: ORIENTATION: LEFT

## 2024-07-19 ASSESSMENT — PAIN DESCRIPTION - PAIN TYPE: TYPE: ACUTE PAIN

## 2024-07-19 NOTE — ED PROVIDER NOTES
[facility] ED  Emergency Department Encounter  Emergency Medicine Resident     Pt Name:Vish Luis  MRN: [id number]  Birthdate 1985  Date of evaluation: 7/19/24  PCP:  No primary care provider on file.  Note Started: 12:26 PM EDT      CHIEF COMPLAINT       Chief Complaint   Patient presents with    Eye Pain     Left eye irration and scratching        HISTORY OF PRESENT ILLNESS  (Location/Symptom, Timing/Onset, Context/Setting, Quality, Duration, Modifying Factors, Severity.)      Vish Luis is a 38 y.o. male who presents with left eye pain. The patient reports that he woke up with an itchy left eye and initially thought it was due to allergies, for which he took Flonase. However, the symptoms have progressively worsened. He describes the pain as severe enough that he can hardly keep the eye open. He denies any runny nose, cough, congestion, or headache. The patient has no history of trauma to the eye and denies any recent yard work or activities that could have introduced a foreign body into the eye. He mentions a similar episode in the past, which was diagnosed as a corneal abrasion. The patient does not wear contact lenses.    PAST MEDICAL / SURGICAL / SOCIAL / FAMILY HISTORY      has no past medical history on file.       has a past surgical history that includes cyst removal (2001).      Social History     Socioeconomic History    Marital status: Single     Spouse name: Not on file    Number of children: Not on file    Years of education: Not on file    Highest education level: Not on file   Occupational History    Not on file   Tobacco Use    Smoking status: Every Day     Current packs/day: 0.75     Types: Cigarettes    Smokeless tobacco: Never   Substance and Sexual Activity    Alcohol use: Yes     Comment: socially    Drug use: No    Sexual activity: Not on file   Other Topics Concern    Not on file   Social History Narrative    Not on file     Social Determinants of Health  any documented procedures.    FINAL IMPRESSION      1. Abrasion of left cornea, initial encounter          DISPOSITION / PLAN     DISPOSITION Decision To Discharge 07/19/2024 01:01:23 PM      PATIENT REFERRED TO:  [facility]  [address] [facility] [id number] [phone]  Schedule an appointment as soon as possible for a visit       [facility] ED  [address] [facility] [id number] [phone]  Go to   If symptoms worsen    Dheeraj Snyder MD  [address] [facility] [id number] [phone]            DISCHARGE MEDICATIONS:  Discharge Medication List as of 7/19/2024  1:28 PM        START taking these medications    Details   erythromycin (ROMYCIN) 5 MG/GM ophthalmic ointment Place the ointment in your left eye 4 times daily for 7 days, Disp-1 g, R-0, Print             Yehuda Alex MD  Emergency Medicine Resident    (Please note that portions of this note were completed with a voice recognition program.  Efforts were made to edit the dictations but occasionally words are mis-transcribed.)

## 2024-07-19 NOTE — DISCHARGE INSTRUCTIONS
Call today or tomorrow to follow up with the eye doctor.    For pain use ibuprofen (Motrin / Advil) or acetaminophen (Tylenol), unless prescribed medications that have acetaminophen in it.  You can take over the counter acetaminophen tablets (1 - 2 tablets of the 500-mg strength every 6 hours) or ibuprofen tablets (2 tablets every 4 hours).      PLEASE RETURN TO THE EMERGENCY DEPARTMENT IMMEDIATELY for worsening symptoms, swelling or drainage from the eye, the white of your eye turns red, inability to see, or if you develop any concerning symptoms such as: high fever not relieved by acetaminophen (Tylenol) and/or ibuprofen (Motrin / Advil), chills, shortness of breath, chest pain, feeling of your heart fluttering or racing, persistent nausea and/or vomiting, vomiting up blood, blood in your stool, numbness, loss of consciousness, weakness or tingling in the arms or legs or change in color of the extremities, changes in mental status, persistent headache, blurry vision, loss of bladder / bowel control, unable to follow up with your physician, or other any other care or concern.